# Patient Record
Sex: FEMALE | Race: BLACK OR AFRICAN AMERICAN
[De-identification: names, ages, dates, MRNs, and addresses within clinical notes are randomized per-mention and may not be internally consistent; named-entity substitution may affect disease eponyms.]

---

## 2019-01-10 ENCOUNTER — HOSPITAL ENCOUNTER (EMERGENCY)
Dept: HOSPITAL 72 - EMR | Age: 54
Discharge: HOME | End: 2019-01-10
Payer: MEDICAID

## 2019-01-10 VITALS — HEIGHT: 65 IN | BODY MASS INDEX: 43.32 KG/M2 | WEIGHT: 260 LBS

## 2019-01-10 VITALS — SYSTOLIC BLOOD PRESSURE: 144 MMHG | DIASTOLIC BLOOD PRESSURE: 85 MMHG
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
## 2019-01-10 DIAGNOSIS — Z86.73: ICD-10-CM

## 2019-01-10 DIAGNOSIS — M77.9: ICD-10-CM

## 2019-01-10 DIAGNOSIS — S63.610A: Primary | ICD-10-CM
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
## 2019-01-10 DIAGNOSIS — I10: ICD-10-CM

## 2019-01-10 PROCEDURE — 29130 APPL FINGER SPLINT STATIC: CPT

## 2019-01-10 PROCEDURE — 99283 EMERGENCY DEPT VISIT LOW MDM: CPT

## 2019-01-10 NOTE — NUR
ED Nurse Note:pt. came with right 2,3 finger numbness and pain for 2 weeks, 
skin is intact no visible swelling noted

## 2019-01-10 NOTE — NUR
ED Nurse Note:



pt discharge instruction provided, pt splint done by ER tech, pt advised to 
follow up with pcp in 2-3days, pt education done, pt verbalized understanding 
and agrees with plan, pt vss, ambulatory w/ steady gait, dressing intact, wrist 
band removed.

## 2019-01-10 NOTE — DIAGNOSTIC IMAGING REPORT
Indication: Right hand

 

Technique: 3 views right hand

 

Comparison: none

 

Findings: No acute fractures. No dislocations. The joint spaces are preserved. Bony

alignment is normal.

 

Impression: Negative

## 2019-01-11 NOTE — EMERGENCY ROOM REPORT
History of Present Illness


General


Chief Complaint:  Pain


Source:  Patient





Present Illness


HPI


Patient presents with complaints of right index finger discomfort she feels 

that essentially when it is extended recently


She has a hard time flexing the finger downwards


Sometimes it stays straight


And appears to be what she describes as almost 'stuck'





Denies any trauma denies any wrist pain denies any neuropathy there is some 

discomfort associated with it approximately on the finger itself


Allergies:  


Coded Allergies:  


     No Known Allergies (Unverified , 11/10/18)





Patient History


Past Medical History:  see triage record


Pertinent Family History:  none


Last Menstrual Period:  2004, total hystrectomy


Reviewed Nursing Documentation:  PMH: Agreed; PSxH: Agreed





Nursing Documentation-PMH


Past Medical History:  No History, Except For


Hx Hypertension:  Yes


Hx Cerebrovascular Accident:  Yes - 2004





Review of Systems


All Other Systems:  negative except mentioned in HPI





Physical Exam





Vital Signs








  Date Time  Temp Pulse Resp B/P (MAP) Pulse Ox O2 Delivery O2 Flow Rate FiO2


 


1/10/19 09:43 98.2 69 14 172/85 95 Room Air  








Sp02 EP Interpretation:  reviewed, normal


General Appearance:  well appearing, no apparent distress


Head:  normocephalic, atraumatic


Eyes:  bilateral eye PERRL, bilateral eye EOMI


ENT:  normal pharynx


Neck:  supple


Musculoskeletal:  other - On my evaluation patient has appropriate flexion 

extension of the digits involved, sensory is intact, there was some 

questionable swelling involved to the proximal aspect of the medial aspect of 

the index finger dorsally but no obvious erythema


Neurologic:  alert, oriented x3, responsive


Skin:  normal color


Lymphatic:  no adenopathy





Procedures


Splinting


Splinting :  


   Consent:  Verbal


   Location:  Right index finger


   Pre-Made Type:  metal


   Splint:  finger splint


   Pre-Proc Neuro Vasc Exam:  normal


   Post-Proc Neuro Vasc Exam:  normal


   Patient Tolerated:  Well


   Complications:  None





Medical Decision Making


Diagnostic Impression:  


 Primary Impression:  


 finger sprain


 Additional Impression:  


 tendinitis


ER Course


Given the history exam initial imaging was obtained to evaluate any bony 

abnormality this was negative patient did have a splint applied given the 

possibility of a tendon


Ligamental type issue/





Patient will have close outpatient follow-up and return with any changes


Other X-Ray Diagnostic Results


Other X-Ray Diagnostic Results :  


   X-Ray ordered:  Right hand


   # of Views/Limited Vs Complete:  3 View


   Indication:  Pain


   EP Interpretation:  Yes


   Interpretation:  no dislocation, no soft tissue swelling, no fractures


   Impression:  No acute disease


   Electronically Signed by:  Vika Curtis DO





Last Vital Signs








  Date Time  Temp Pulse Resp B/P (MAP) Pulse Ox O2 Delivery O2 Flow Rate FiO2


 


1/10/19 11:25 98.2 78 16 140/88 98 Room Air  








Status:  improved


Disposition:  HOME, SELF-CARE


Condition:  Improved


Referrals:  


NON PHYSICIAN (PCP)


Patient Instructions:  Tendinitis, Finger Sprain, Easy-to-Read





Additional Instructions:  


Patient is provided with the discharge instructions notified to follow up with 

primary doctor in the next 2-3 days otherwise return to the er with any 

worsening symptoms.


Please note that this report is being documented using DRAGON technology.  This 

can lead to erroneous entry secondary to incorrect interpretation by the 

dictating instrument.











Vika Curtis DO Jan 11, 2019 07:47

## 2019-09-04 ENCOUNTER — HOSPITAL ENCOUNTER (EMERGENCY)
Dept: HOSPITAL 72 - EMR | Age: 54
Discharge: HOME | End: 2019-09-04
Payer: MEDICAID

## 2019-09-04 VITALS — BODY MASS INDEX: 42.68 KG/M2 | HEIGHT: 64 IN | WEIGHT: 250 LBS

## 2019-09-04 VITALS — SYSTOLIC BLOOD PRESSURE: 169 MMHG | DIASTOLIC BLOOD PRESSURE: 105 MMHG
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
## 2019-09-04 DIAGNOSIS — Z86.73: ICD-10-CM

## 2019-09-04 DIAGNOSIS — Z90.710: ICD-10-CM

## 2019-09-04 DIAGNOSIS — S80.01XA: Primary | ICD-10-CM

## 2019-09-04 PROCEDURE — 99283 EMERGENCY DEPT VISIT LOW MDM: CPT

## 2019-09-04 NOTE — NUR
ED Nurse Note: Patient does not know name of her meds. 

-------------------------------------------------------------------------------

Addendum: 09/04/19 at 1001 by ANDREW

-------------------------------------------------------------------------------

Patient reporst no headache, dizziness or CP at this time.

## 2019-09-04 NOTE — NUR
ED Nurse Note:



ambulated in to ED due to right knee pain with swelling after tripped and fall 
at home. denies dizziness prior to injury. Ice pack applied on right knee. No 
abrasion noted